# Patient Record
Sex: FEMALE | Race: WHITE | Employment: FULL TIME | URBAN - METROPOLITAN AREA
[De-identification: names, ages, dates, MRNs, and addresses within clinical notes are randomized per-mention and may not be internally consistent; named-entity substitution may affect disease eponyms.]

---

## 2019-04-30 ENCOUNTER — APPOINTMENT (OUTPATIENT)
Dept: CT IMAGING | Age: 44
End: 2019-04-30
Payer: COMMERCIAL

## 2019-04-30 ENCOUNTER — HOSPITAL ENCOUNTER (EMERGENCY)
Age: 44
Discharge: HOME | End: 2019-04-30
Attending: EMERGENCY MEDICINE
Payer: COMMERCIAL

## 2019-04-30 VITALS
WEIGHT: 158 LBS | SYSTOLIC BLOOD PRESSURE: 131 MMHG | HEIGHT: 63 IN | OXYGEN SATURATION: 99 % | TEMPERATURE: 98.3 F | HEART RATE: 83 BPM | BODY MASS INDEX: 28 KG/M2 | RESPIRATION RATE: 16 BRPM | DIASTOLIC BLOOD PRESSURE: 77 MMHG

## 2019-04-30 DIAGNOSIS — R10.31 RIGHT LOWER QUADRANT ABDOMINAL PAIN: Primary | ICD-10-CM

## 2019-04-30 LAB
ANION GAP SERPL CALCULATED.3IONS-SCNC: 13 MMOL/L (ref 3–16)
BACTERIA: ABNORMAL /HPF
BASE EXCESS VENOUS: -2 (ref -3–3)
BASOPHILS ABSOLUTE: 0.2 K/UL (ref 0–0.2)
BASOPHILS RELATIVE PERCENT: 1.3 %
BILIRUBIN URINE: NEGATIVE
BLOOD, URINE: ABNORMAL
BUN BLDV-MCNC: 16 MG/DL (ref 7–20)
CALCIUM SERPL-MCNC: 9.3 MG/DL (ref 8.3–10.6)
CHLORIDE BLD-SCNC: 100 MMOL/L (ref 99–110)
CLARITY: CLEAR
CO2: 25 MMOL/L (ref 21–32)
COLOR: YELLOW
CREAT SERPL-MCNC: 0.6 MG/DL (ref 0.6–1.1)
EOSINOPHILS ABSOLUTE: 0.1 K/UL (ref 0–0.6)
EOSINOPHILS RELATIVE PERCENT: 1.1 %
GFR AFRICAN AMERICAN: >60
GFR NON-AFRICAN AMERICAN: >60
GLUCOSE BLD-MCNC: 87 MG/DL (ref 70–99)
GLUCOSE URINE: NEGATIVE MG/DL
HCG(URINE) PREGNANCY TEST: NEGATIVE
HCO3 VENOUS: 25.1 MMOL/L (ref 23–29)
HCT VFR BLD CALC: 38.8 % (ref 36–48)
HEMOGLOBIN: 12.7 G/DL (ref 12–16)
KETONES, URINE: NEGATIVE MG/DL
LEUKOCYTE ESTERASE, URINE: NEGATIVE
LYMPHOCYTES ABSOLUTE: 2.6 K/UL (ref 1–5.1)
LYMPHOCYTES RELATIVE PERCENT: 23.1 %
MCH RBC QN AUTO: 31.1 PG (ref 26–34)
MCHC RBC AUTO-ENTMCNC: 32.7 G/DL (ref 31–36)
MCV RBC AUTO: 95.3 FL (ref 80–100)
MICROSCOPIC EXAMINATION: YES
MONOCYTES ABSOLUTE: 0.9 K/UL (ref 0–1.3)
MONOCYTES RELATIVE PERCENT: 8.2 %
MUCUS: ABNORMAL /LPF
NEUTROPHILS ABSOLUTE: 7.5 K/UL (ref 1.7–7.7)
NEUTROPHILS RELATIVE PERCENT: 66.3 %
NITRITE, URINE: NEGATIVE
O2 SAT, VEN: 47 %
PCO2, VEN: 53.8 MM HG (ref 40–50)
PDW BLD-RTO: 13.7 % (ref 12.4–15.4)
PERFORMED ON: ABNORMAL
PH UA: 6 (ref 5–8)
PH VENOUS: 7.28 (ref 7.35–7.45)
PLATELET # BLD: 231 K/UL (ref 135–450)
PMV BLD AUTO: 9.1 FL (ref 5–10.5)
PO2, VEN: 29 MM HG
POC SAMPLE TYPE: ABNORMAL
POTASSIUM REFLEX MAGNESIUM: 4 MMOL/L (ref 3.5–5.1)
PROTEIN UA: NEGATIVE MG/DL
RBC # BLD: 4.07 M/UL (ref 4–5.2)
RBC UA: ABNORMAL /HPF (ref 0–2)
SODIUM BLD-SCNC: 138 MMOL/L (ref 136–145)
SPECIFIC GRAVITY UA: 1.02 (ref 1–1.03)
TCO2 CALC VENOUS: 27 MMOL/L
URINE TYPE: ABNORMAL
UROBILINOGEN, URINE: 0.2 E.U./DL
WBC # BLD: 11.4 K/UL (ref 4–11)
WBC UA: ABNORMAL /HPF (ref 0–5)

## 2019-04-30 PROCEDURE — 85025 COMPLETE CBC W/AUTO DIFF WBC: CPT

## 2019-04-30 PROCEDURE — 6360000004 HC RX CONTRAST MEDICATION: Performed by: EMERGENCY MEDICINE

## 2019-04-30 PROCEDURE — 84703 CHORIONIC GONADOTROPIN ASSAY: CPT

## 2019-04-30 PROCEDURE — 82803 BLOOD GASES ANY COMBINATION: CPT

## 2019-04-30 PROCEDURE — 80048 BASIC METABOLIC PNL TOTAL CA: CPT

## 2019-04-30 PROCEDURE — 74177 CT ABD & PELVIS W/CONTRAST: CPT

## 2019-04-30 PROCEDURE — 81001 URINALYSIS AUTO W/SCOPE: CPT

## 2019-04-30 PROCEDURE — 99284 EMERGENCY DEPT VISIT MOD MDM: CPT

## 2019-04-30 RX ORDER — DEXTROAMPHETAMINE SACCHARATE, AMPHETAMINE ASPARTATE, DEXTROAMPHETAMINE SULFATE AND AMPHETAMINE SULFATE 7.5; 7.5; 7.5; 7.5 MG/1; MG/1; MG/1; MG/1
30 TABLET ORAL DAILY
COMMUNITY

## 2019-04-30 RX ORDER — SERTRALINE HYDROCHLORIDE 100 MG/1
200 TABLET, FILM COATED ORAL DAILY
COMMUNITY

## 2019-04-30 RX ORDER — HYDROCHLOROTHIAZIDE 12.5 MG/1
12.5 TABLET ORAL DAILY
COMMUNITY

## 2019-04-30 RX ADMIN — IOPAMIDOL 80 ML: 755 INJECTION, SOLUTION INTRAVENOUS at 19:59

## 2019-04-30 ASSESSMENT — ENCOUNTER SYMPTOMS
NAUSEA: 1
WHEEZING: 0
ABDOMINAL PAIN: 1
COUGH: 0
VOMITING: 0
SHORTNESS OF BREATH: 0
SORE THROAT: 0

## 2019-04-30 ASSESSMENT — PAIN SCALES - GENERAL: PAINLEVEL_OUTOF10: 3

## 2019-04-30 ASSESSMENT — PAIN DESCRIPTION - PAIN TYPE: TYPE: ACUTE PAIN

## 2019-04-30 ASSESSMENT — PAIN DESCRIPTION - ORIENTATION: ORIENTATION: RIGHT;LOWER

## 2019-04-30 ASSESSMENT — PAIN DESCRIPTION - DESCRIPTORS: DESCRIPTORS: SORE

## 2019-04-30 ASSESSMENT — PAIN DESCRIPTION - LOCATION: LOCATION: ABDOMEN

## 2019-04-30 NOTE — LETTER
[facility] Emergency Department  [address] [place] [id number]  Phone: [phone]  Fax: [phone]         April 30, 2019     Patient: Jakob Howell   YOB: 1975   Date of Visit: 4/30/2019       To Whom It May Concern:    Jakob Howell was seen and treated in our emergency department on 4/30/2019. The following work duties are recommended. She may return to work on 5/2/19    Treatment and work recommendations given by the emergency department are initial emergency measures only, and follow up should be arranged as soon as possible with the company's occupational health provider* or the specialist to whom the worker was referred.     *If the company does not have an occupational health provider, follow up should be at Vern Lewis MD  [id number]   [phone]    Schedule an appointment as soon as possible for a visit             Sincerely,        Kinza Puckett PA-C        Signature:__________________________________

## 2019-04-30 NOTE — ED PROVIDER NOTES
ED Attending Attestation Note     Date of evaluation: 4/30/2019    This patient was seen by the advance practice provider. I have seen and examined the patient, agree with the workup, evaluation, management and diagnosis. The care plan has been discussed. My assessment reveals patient comes in with right lower quadrant abdominal pain since noon today sudden onset. She had been on birth control for many years but insurance problems caused her to stop them for a week or so and she was started. On exam mild pain right lower quadrant without rebound or guarding. Differential diagnosis includes ovarian cyst secondary to starting birth control.   Could be appendicitis therefore CT was obtained     Trent Gutierrez MD  04/30/19 [id number]

## 2019-04-30 NOTE — ED PROVIDER NOTES
disease. She has a past surgical history that includes  section. Her family history is not on file. She reports that she has quit smoking. She does not have any smokeless tobacco history on file. She reports that she drinks alcohol. She reports that she does not use drugs. Medications     Discharge Medication List as of 2019  9:12 PM      CONTINUE these medications which have NOT CHANGED    Details   sertraline (ZOLOFT) 100 MG tablet Take 200 mg by mouth dailyHistorical Med      hydrochlorothiazide (HYDRODIURIL) 12.5 MG tablet Take 12.5 mg by mouth dailyHistorical Med      amphetamine-dextroamphetamine (ADDERALL) 30 MG tablet Take 30 mg by mouth daily. Historical Med             Allergies     She has No Known Allergies. Physical Exam     INITIAL VITALS: BP: (!) 144/79, Temp: 98.3 °F (36.8 °C), Pulse: 92, Resp: 17, SpO2: 96 %     General: Well appearing, well nourished, in no apparent state of distress. HEENT:  Normocephalic, atraumatic. Pupils equal, sclera white. Handling secretions without difficulty. Neck: No meningismus. Trachea midline    Pulmonary: Respirations even. Non labored. No tachypnea. Cardiac: Chest symmetrical and non-tender on palpation of chest wall. Abdomen:  Non-distended. Bowel sounds present in all quadrants. Point tenderness at McBurney's point with no rebound tenderness. Negative heel bump and obturator test.  No obvious palpable masses. No superpubic tenderness or fullness. Musculoskeletal:  Ambulates under own control. Neuro:  Alert and oriented x 3. CN II - XII grossly intact. Moves all extremities spontaneously. Vascular:  2+ peripheral pulses in bilateral upper and lower extremities      Skin:  Warm and well perfused without rashes or lesions    Psych:  Appropriate mood and affect        Diagnostic Results       RADIOLOGY:  CT ABDOMEN PELVIS W IV CONTRAST Additional Contrast? None   Final Result      1.  Colonic diverticulosis without diverticulitis. Circumferential wall thickening involving the urinary bladder indeterminate. Findings may relate to cystitis. Normal physiologic follicles within the right ovary. Normal appendix.           LABS:   Results for orders placed or performed during the hospital encounter of 04/30/19   CBC Auto Differential   Result Value Ref Range    WBC 11.4 (H) 4.0 - 11.0 K/uL    RBC 4.07 4.00 - 5.20 M/uL    Hemoglobin 12.7 12.0 - 16.0 g/dL    Hematocrit 38.8 36.0 - 48.0 %    MCV 95.3 80.0 - 100.0 fL    MCH 31.1 26.0 - 34.0 pg    MCHC 32.7 31.0 - 36.0 g/dL    RDW 13.7 12.4 - 15.4 %    Platelets 850 436 - 517 K/uL    MPV 9.1 5.0 - 10.5 fL    Neutrophils % 66.3 %    Lymphocytes % 23.1 %    Monocytes % 8.2 %    Eosinophils % 1.1 %    Basophils % 1.3 %    Neutrophils # 7.5 1.7 - 7.7 K/uL    Lymphocytes # 2.6 1.0 - 5.1 K/uL    Monocytes # 0.9 0.0 - 1.3 K/uL    Eosinophils # 0.1 0.0 - 0.6 K/uL    Basophils # 0.2 0.0 - 0.2 K/uL   Basic Metabolic Panel w/ Reflex to MG   Result Value Ref Range    Sodium 138 136 - 145 mmol/L    Potassium reflex Magnesium 4.0 3.5 - 5.1 mmol/L    Chloride 100 99 - 110 mmol/L    CO2 25 21 - 32 mmol/L    Anion Gap 13 3 - 16    Glucose 87 70 - 99 mg/dL    BUN 16 7 - 20 mg/dL    CREATININE 0.6 0.6 - 1.1 mg/dL    GFR Non-African American >60 >60    GFR African American >60 >60    Calcium 9.3 8.3 - 10.6 mg/dL   Urinalysis, reflex to microscopic   Result Value Ref Range    Color, UA Yellow Straw/Yellow    Clarity, UA Clear Clear    Glucose, Ur Negative Negative mg/dL    Bilirubin Urine Negative Negative    Ketones, Urine Negative Negative mg/dL    Specific Gravity, UA 1.020 1.005 - 1.030    Blood, Urine TRACE-INTACT (A) Negative    pH, UA 6.0 5.0 - 8.0    Protein, UA Negative Negative mg/dL    Urobilinogen, Urine 0.2 <2.0 E.U./dL    Nitrite, Urine Negative Negative    Leukocyte Esterase, Urine Negative Negative    Microscopic Examination YES     Urine Type Not Specified    Pregnancy, Urine Result Value Ref Range    HCG(Urine) Pregnancy Test Negative Detects HCG level >20 MIU/mL   Microscopic Urinalysis   Result Value Ref Range    Mucus, UA 1+ (A) /LPF    WBC, UA 0-2 0 - 5 /HPF    RBC, UA 0-2 0 - 2 /HPF    Bacteria, UA Rare (A) /HPF   POCT Venous   Result Value Ref Range    pH, Umm 7.276 (L) 7.350 - 7.450    pCO2, Umm 53.8 (H) 40.0 - 50.0 mm Hg    pO2, Umm 29 Not Established mm Hg    HCO3, Venous 25.1 23.0 - 29.0 mmol/L    Base Excess, Umm -2 -3 - 3    O2 Sat, Umm 47 Not Established %    TC02 (Calc), Umm 27 Not Established mmol/L    Sample Type UMM     Performed on SEE BELOW        ED BEDSIDE ULTRASOUND:      RECENT VITALS:  BP: 131/77, Temp: 98.3 °F (36.8 °C), Pulse: 83, Resp: 16, SpO2: 99 %     Procedures       ED Course     Nursing Notes, Past Medical Hx, Past Surgical Hx, Social Hx, Allergies, and Family Hx were reviewed. The patient was given the following medications:  Orders Placed This Encounter   Medications    iopamidol (ISOVUE-370) 76 % injection 80 mL       CONSULTS:  None    MEDICAL DECISION MAKING / ASSESSMENT / Domenico Trotter is a 44 y.o. female presents with 2 episodes of gradually worsening sharp right lower quadrant abdominal pain. Reproducible pain over McBurney's point with no suprapubic tenderness on examination. She is otherwise hemodynamically stable and afebrile. Greatest concern this time is for appendicitis, ovarian cyst rupture, less likely ovarian torsion. Nonspecific leukocytosis of 11.7. Hemoglobin stable. Cross sectional imaging of the patient's abdomen did not reveal any acute pathology. Some moderately enlarged follicles of the right ovary without intrapelvic free fluid. She denies any vaginal discharge, vaginal bleeding, and a urine pregnant test was negative and noninfectious. On reexamination her pain has nearly resolved. This likely represents a ruptured ovarian follicle consistent with mittelschmerz pain.   She'll follow-up with her

## 2019-05-01 NOTE — ED NOTES
Provided patient a warm blanket for comfort.      [address] [facility] JOSEE Jimenez  04/30/19 2013

## 2022-08-02 ENCOUNTER — HOSPITAL ENCOUNTER (OUTPATIENT)
Dept: MAMMOGRAPHY | Age: 47
Discharge: HOME | End: 2022-08-02
Payer: COMMERCIAL

## 2022-08-02 VITALS — HEIGHT: 64 IN | BODY MASS INDEX: 27.31 KG/M2 | WEIGHT: 160 LBS

## 2022-08-02 DIAGNOSIS — Z12.31 VISIT FOR SCREENING MAMMOGRAM: ICD-10-CM

## 2022-08-02 PROCEDURE — 77067 SCR MAMMO BI INCL CAD: CPT
